# Patient Record
Sex: FEMALE | Race: WHITE | Employment: OTHER | URBAN - METROPOLITAN AREA
[De-identification: names, ages, dates, MRNs, and addresses within clinical notes are randomized per-mention and may not be internally consistent; named-entity substitution may affect disease eponyms.]

---

## 2017-07-09 ENCOUNTER — APPOINTMENT (OUTPATIENT)
Dept: GENERAL RADIOLOGY | Facility: HOSPITAL | Age: 52
End: 2017-07-09
Attending: EMERGENCY MEDICINE
Payer: COMMERCIAL

## 2017-07-09 PROCEDURE — 71101 X-RAY EXAM UNILAT RIBS/CHEST: CPT | Performed by: EMERGENCY MEDICINE

## 2017-07-09 NOTE — BH PROGRESS NOTE
Insurance pre-cert completed for inpatient transfer. Pt approved for 5 days, 7/9-7/13/17. Auth #[id number]. [facility] called and notified. Nurse to nurse to be done after shift change, 3:30 pm, at [phone].

## 2017-07-09 NOTE — ED PROVIDER NOTES
Patient Seen in: [facility] AND [facility] Emergency Department    History   Patient presents with:  Eval-P (psychiatric)    Stated Complaint: cp    HPI    The patient is a 78-year-old female with past history of fibromyalgia, depression, anxiety who presents no src: Oral  SpO2: 99 %  O2 Device: None (Room air)    Current:/75   Pulse 70   Temp (!) 97 °F (36.1 °C) (Oral)   Resp 14   Ht 157.5 cm (5' 2\")   Wt 59 kg   SpO2 97%   BMI 23.78 kg/m²         Physical Exam    Constitutional: Well-developed well-nouris PLATELET.   Procedure                               Abnormality         Status                     ---------                               -----------         ------                     CBC W/ DIFFERENTIAL[[id number]]          Abnormal            Final resul

## 2017-07-09 NOTE — ED NOTES
Pt safe to transfer to [facility] per MD. [facility]. Belongings given to [facility] by security.

## 2017-07-09 NOTE — BH PROGRESS NOTE
Level of Care Assessment Note     General Questions  Why are you here?: \"When I said I wanted to kill myself what I meant was I've been in pain a long, long time. I'm tired, I'm worn out and I want it to stop hurting. I don't want to die.   I just want i Ideation: Pt told RN and MD she wants to hurt herself, but denied during the assessment and pt stated she just wants to pain to stop. \"I would like to be dead but I don't want to miss out on anything ahead of me. \"  Current/Recent/Future Suicide Plan: No of Access: Household means  Access to [employer]: No  Discussion for Removal: N/A  Do you have a firearm owner ID card?: No  Access to Means Collateral Provided By[de-identified] Daughter  Describe Access to Means Collateral: Agrees with pt     Self Injury  History Julia (?)  Dates of Treatment:  For years  Date Last Seen: 6/17/17  Reason: Med management  Effectiveness : Pt does not think her meds are helping  Current/Previous MH/CD Treatment  Recovery Support Groups: Denies Past History  History of Seclusion/Restra Abuse: Denies  Verbal Abuse: Yes, past (Comment) (Ex-)  Sexual Abuse: Denies  Neglect: Denies  Does anyone say or do something to you that makes you feel unsafe?: No  Have You Ever Been Harmed by a Partner/Caregiver?: No  Health Concerns r/t Abuse: Bipolar sx, as well as a recent diagnosis of Sindy. Daughter said pt has extreme ups and downs and was unable to contract for safety.   Daughter said as recently as a few months ago family had to call 911 after pt threatened to kill herself in a hotel room

## 2017-07-09 NOTE — ED NOTES
Report given to [facility] TERRAZAS, RN at [facility].  Transport called for 17:30

## 2017-07-09 NOTE — ED NOTES
Pt states \"I might as well go home and commit suicide\". Reports depression and anxiety.  Hx suicide attempts in the past.

## 2017-07-09 NOTE — ED INITIAL ASSESSMENT (HPI)
Left side chest pain \"for months\". Pt states \"I don't know if it's my breast or my rib\". Denies sob. Had xrays done in April, workup was negative. S/S worsening per pt.

## 2017-07-09 NOTE — ED NOTES
Pt able to eat lunch independently after set up. RR even and nonlabored, tearful but redirectable. Security remains at bedside.

## 2018-06-24 ENCOUNTER — IMAGING SERVICES (OUTPATIENT)
Dept: OTHER | Age: 53
End: 2018-06-24

## 2018-06-24 ENCOUNTER — CHARTING TRANS (OUTPATIENT)
Dept: OTHER | Age: 53
End: 2018-06-24

## 2018-06-24 ENCOUNTER — DIAGNOSTIC TRANS (OUTPATIENT)
Dept: OTHER | Age: 53
End: 2018-06-24

## 2018-06-24 ENCOUNTER — HOSPITAL (OUTPATIENT)
Dept: OTHER | Age: 53
End: 2018-06-24
Attending: HOSPITALIST

## 2018-06-24 LAB
AMORPH SED URNS QL MICRO: ABNORMAL
ANALYZER ANC (IANC): ABNORMAL
ANION GAP SERPL CALC-SCNC: 12 MMOL/L (ref 10–20)
APPEARANCE UR: CLEAR
APTT PPP: 21 SECONDS (ref 22–30)
APTT PPP: ABNORMAL S
BACTERIA #/AREA URNS HPF: ABNORMAL /HPF
BASOPHILS # BLD: 0 THOUSAND/MCL (ref 0–0.3)
BASOPHILS NFR BLD: 0 %
BILIRUB UR QL: NEGATIVE
BUN SERPL-MCNC: 17 MG/DL (ref 6–20)
BUN/CREAT SERPL: 24 (ref 7–25)
CALCIUM SERPL-MCNC: 8.5 MG/DL (ref 8.4–10.2)
CAOX CRY URNS QL MICRO: ABNORMAL
CHLORIDE: 111 MMOL/L (ref 98–107)
CO2 SERPL-SCNC: 26 MMOL/L (ref 21–32)
COLOR UR: YELLOW
CREAT SERPL-MCNC: 0.7 MG/DL (ref 0.51–0.95)
DIFFERENTIAL METHOD BLD: ABNORMAL
EOSINOPHIL # BLD: 0 THOUSAND/MCL (ref 0.1–0.5)
EOSINOPHIL NFR BLD: 0 %
EPITH CASTS #/AREA URNS LPF: ABNORMAL /[LPF]
ERYTHROCYTE [DISTWIDTH] IN BLOOD: 15 % (ref 11–15)
FATTY CASTS #/AREA URNS LPF: ABNORMAL /[LPF]
GLUCOSE SERPL-MCNC: 107 MG/DL (ref 65–99)
GLUCOSE UR-MCNC: NEGATIVE MG/DL
GRAN CASTS #/AREA URNS LPF: ABNORMAL /[LPF]
HEMATOCRIT: 35.5 % (ref 36–46.5)
HGB BLD-MCNC: 11.9 GM/DL (ref 12–15.5)
HGB UR QL: NEGATIVE
HYALINE CASTS #/AREA URNS LPF: ABNORMAL /LPF (ref 0–5)
INR PPP: 1
KETONES UR-MCNC: 20 MG/DL
LEUKOCYTE ESTERASE UR QL STRIP: NEGATIVE
LYMPHOCYTES # BLD: 0.7 THOUSAND/MCL (ref 1–4)
LYMPHOCYTES NFR BLD: 7 %
MCH RBC QN AUTO: 30.9 PG (ref 26–34)
MCHC RBC AUTO-ENTMCNC: 33.5 GM/DL (ref 32–36.5)
MCV RBC AUTO: 92.2 FL (ref 78–100)
MIXED CELL CASTS #/AREA URNS LPF: ABNORMAL /[LPF]
MONOCYTES # BLD: 0.7 THOUSAND/MCL (ref 0.3–0.9)
MONOCYTES NFR BLD: 6 %
MUCOUS THREADS URNS QL MICRO: PRESENT
NEUTROPHILS # BLD: 9.3 THOUSAND/MCL (ref 1.8–7.7)
NEUTROPHILS NFR BLD: 87 %
NEUTS SEG NFR BLD: ABNORMAL %
NITRITE UR QL: NEGATIVE
NRBC (NRBCRE): ABNORMAL
PH UR: 7 UNIT (ref 5–7)
PLATELET # BLD: 278 THOUSAND/MCL (ref 140–450)
POTASSIUM SERPL-SCNC: 3.9 MMOL/L (ref 3.4–5.1)
PROT UR QL: NEGATIVE MG/DL
PROTHROMBIN TIME: 10 SECONDS (ref 9.7–11.8)
PROTHROMBIN TIME: NORMAL
RBC # BLD: 3.85 MILLION/MCL (ref 4–5.2)
RBC #/AREA URNS HPF: ABNORMAL /HPF (ref 0–3)
RBC CASTS #/AREA URNS LPF: ABNORMAL /[LPF]
RENAL EPI CELLS #/AREA URNS HPF: ABNORMAL /[HPF]
SODIUM SERPL-SCNC: 145 MMOL/L (ref 135–145)
SP GR UR: 1.02 (ref 1–1.03)
SPECIMEN SOURCE: ABNORMAL
SPERM URNS QL MICRO: ABNORMAL
SQUAMOUS #/AREA URNS HPF: ABNORMAL /HPF (ref 0–5)
T VAGINALIS URNS QL MICRO: ABNORMAL
TRI-PHOS CRY URNS QL MICRO: ABNORMAL
URATE CRY URNS QL MICRO: ABNORMAL
URINE REFLEX: ABNORMAL
URNS CMNT MICRO: ABNORMAL
UROBILINOGEN UR QL: 2 MG/DL (ref 0–1)
WAXY CASTS #/AREA URNS LPF: ABNORMAL /[LPF]
WBC # BLD: 10.8 THOUSAND/MCL (ref 4.2–11)
WBC #/AREA URNS HPF: ABNORMAL /HPF (ref 0–5)
WBC CASTS #/AREA URNS LPF: ABNORMAL /[LPF]
YEAST HYPHAE URNS QL MICRO: ABNORMAL
YEAST URNS QL MICRO: ABNORMAL

## 2018-06-25 LAB
25(OH)D3+25(OH)D2 SERPL-MCNC: 15.3 NG/ML (ref 30–100)
ANALYZER ANC (IANC): ABNORMAL
ANION GAP SERPL CALC-SCNC: 16 MMOL/L (ref 10–20)
BASOPHILS # BLD: 0 THOUSAND/MCL (ref 0–0.3)
BASOPHILS NFR BLD: 0 %
BUN SERPL-MCNC: 11 MG/DL (ref 6–20)
BUN/CREAT SERPL: 17 (ref 7–25)
CALCIUM SERPL-MCNC: 8.5 MG/DL (ref 8.4–10.2)
CHLORIDE: 106 MMOL/L (ref 98–107)
CO2 SERPL-SCNC: 24 MMOL/L (ref 21–32)
CREAT SERPL-MCNC: 0.65 MG/DL (ref 0.51–0.95)
DIFFERENTIAL METHOD BLD: ABNORMAL
EOSINOPHIL # BLD: 0 THOUSAND/MCL (ref 0.1–0.5)
EOSINOPHIL NFR BLD: 0 %
ERYTHROCYTE [DISTWIDTH] IN BLOOD: 15.3 % (ref 11–15)
GLUCOSE SERPL-MCNC: 134 MG/DL (ref 65–99)
HEMATOCRIT: 30.5 % (ref 36–46.5)
HGB BLD-MCNC: 10 GM/DL (ref 12–15.5)
LYMPHOCYTES # BLD: 0.5 THOUSAND/MCL (ref 1–4)
LYMPHOCYTES NFR BLD: 8 %
MCH RBC QN AUTO: 30.8 PG (ref 26–34)
MCHC RBC AUTO-ENTMCNC: 32.8 GM/DL (ref 32–36.5)
MCV RBC AUTO: 93.8 FL (ref 78–100)
MONOCYTES # BLD: 0.4 THOUSAND/MCL (ref 0.3–0.9)
MONOCYTES NFR BLD: 7 %
NEUTROPHILS # BLD: 5.2 THOUSAND/MCL (ref 1.8–7.7)
NEUTROPHILS NFR BLD: 85 %
NEUTS SEG NFR BLD: ABNORMAL %
NRBC (NRBCRE): ABNORMAL
PLATELET # BLD: 287 THOUSAND/MCL (ref 140–450)
POTASSIUM SERPL-SCNC: 3.8 MMOL/L (ref 3.4–5.1)
RBC # BLD: 3.25 MILLION/MCL (ref 4–5.2)
SODIUM SERPL-SCNC: 142 MMOL/L (ref 135–145)
WBC # BLD: 6.1 THOUSAND/MCL (ref 4.2–11)

## 2018-06-27 LAB
ANALYZER ANC (IANC): ABNORMAL
ANALYZER ANC (IANC): ABNORMAL
ANION GAP SERPL CALC-SCNC: 12 MMOL/L (ref 10–20)
APTT PPP: 25 SECONDS (ref 22–30)
APTT PPP: NORMAL S
BASOPHILS # BLD: 0 THOUSAND/MCL (ref 0–0.3)
BASOPHILS NFR BLD: 0 %
BUN SERPL-MCNC: 14 MG/DL (ref 6–20)
BUN/CREAT SERPL: 26 (ref 7–25)
CALCIUM SERPL-MCNC: 8.5 MG/DL (ref 8.4–10.2)
CHLORIDE: 104 MMOL/L (ref 98–107)
CO2 SERPL-SCNC: 28 MMOL/L (ref 21–32)
CREAT SERPL-MCNC: 0.53 MG/DL (ref 0.51–0.95)
DIFFERENTIAL METHOD BLD: ABNORMAL
EOSINOPHIL # BLD: 0 THOUSAND/MCL (ref 0.1–0.5)
EOSINOPHIL NFR BLD: 0 %
ERYTHROCYTE [DISTWIDTH] IN BLOOD: 15.2 % (ref 11–15)
ERYTHROCYTE [DISTWIDTH] IN BLOOD: 15.3 % (ref 11–15)
GLUCOSE SERPL-MCNC: 137 MG/DL (ref 65–99)
HEMATOCRIT: 28.3 % (ref 36–46.5)
HEMATOCRIT: 29.6 % (ref 36–46.5)
HGB BLD-MCNC: 9.4 GM/DL (ref 12–15.5)
HGB BLD-MCNC: 9.9 GM/DL (ref 12–15.5)
INR PPP: 0.9
LYMPHOCYTES # BLD: 1.5 THOUSAND/MCL (ref 1–4)
LYMPHOCYTES NFR BLD: 21 %
MCH RBC QN AUTO: 31 PG (ref 26–34)
MCH RBC QN AUTO: 31.3 PG (ref 26–34)
MCHC RBC AUTO-ENTMCNC: 33.2 GM/DL (ref 32–36.5)
MCHC RBC AUTO-ENTMCNC: 33.4 GM/DL (ref 32–36.5)
MCV RBC AUTO: 93.4 FL (ref 78–100)
MCV RBC AUTO: 93.7 FL (ref 78–100)
MONOCYTES # BLD: 0.2 THOUSAND/MCL (ref 0.3–0.9)
MONOCYTES NFR BLD: 3 %
NEUTROPHILS # BLD: 5.4 THOUSAND/MCL (ref 1.8–7.7)
NEUTROPHILS NFR BLD: 76 %
NEUTS SEG NFR BLD: ABNORMAL %
NRBC (NRBCRE): ABNORMAL
NRBC (NRBCRE): ABNORMAL
PLATELET # BLD: 288 THOUSAND/MCL (ref 140–450)
PLATELET # BLD: 290 THOUSAND/MCL (ref 140–450)
POTASSIUM SERPL-SCNC: 3.6 MMOL/L (ref 3.4–5.1)
PROTHROMBIN TIME: 9 SECONDS (ref 9.7–11.8)
PROTHROMBIN TIME: ABNORMAL
RBC # BLD: 3.03 MILLION/MCL (ref 4–5.2)
RBC # BLD: 3.16 MILLION/MCL (ref 4–5.2)
SODIUM SERPL-SCNC: 140 MMOL/L (ref 135–145)
WBC # BLD: 6.9 THOUSAND/MCL (ref 4.2–11)
WBC # BLD: 7.1 THOUSAND/MCL (ref 4.2–11)

## 2018-06-28 LAB
ANALYZER ANC (IANC): ABNORMAL
APTT PPP: 40 SECONDS (ref 22–30)
APTT PPP: 48 SECONDS (ref 22–30)
APTT PPP: 49 SECONDS (ref 22–30)
APTT PPP: 72 SECONDS (ref 22–30)
APTT PPP: ABNORMAL S
ERYTHROCYTE [DISTWIDTH] IN BLOOD: 15.2 % (ref 11–15)
HEMATOCRIT: 30.7 % (ref 36–46.5)
HGB BLD-MCNC: 10.2 GM/DL (ref 12–15.5)
MCH RBC QN AUTO: 30.9 PG (ref 26–34)
MCHC RBC AUTO-ENTMCNC: 33.2 GM/DL (ref 32–36.5)
MCV RBC AUTO: 93 FL (ref 78–100)
NRBC (NRBCRE): ABNORMAL
PLATELET # BLD: 311 THOUSAND/MCL (ref 140–450)
RBC # BLD: 3.3 MILLION/MCL (ref 4–5.2)
WBC # BLD: 7 THOUSAND/MCL (ref 4.2–11)

## 2018-06-29 LAB
ANALYZER ANC (IANC): ABNORMAL
APTT PPP: 54 SECONDS (ref 22–30)
APTT PPP: ABNORMAL S
ERYTHROCYTE [DISTWIDTH] IN BLOOD: 15.1 % (ref 11–15)
HEMATOCRIT: 28.7 % (ref 36–46.5)
HGB BLD-MCNC: 9.5 GM/DL (ref 12–15.5)
MCH RBC QN AUTO: 30.9 PG (ref 26–34)
MCHC RBC AUTO-ENTMCNC: 33.1 GM/DL (ref 32–36.5)
MCV RBC AUTO: 93.5 FL (ref 78–100)
NRBC (NRBCRE): ABNORMAL
PLATELET # BLD: 317 THOUSAND/MCL (ref 140–450)
RBC # BLD: 3.07 MILLION/MCL (ref 4–5.2)
WBC # BLD: 6.3 THOUSAND/MCL (ref 4.2–11)

## 2018-06-30 LAB
ANALYZER ANC (IANC): ABNORMAL
APTT PPP: 47 SECONDS (ref 22–30)
APTT PPP: ABNORMAL S
ERYTHROCYTE [DISTWIDTH] IN BLOOD: 14.9 % (ref 11–15)
HEMATOCRIT: 31 % (ref 36–46.5)
HGB BLD-MCNC: 10.3 GM/DL (ref 12–15.5)
MCH RBC QN AUTO: 30.7 PG (ref 26–34)
MCHC RBC AUTO-ENTMCNC: 33.2 GM/DL (ref 32–36.5)
MCV RBC AUTO: 92.5 FL (ref 78–100)
NRBC (NRBCRE): ABNORMAL
PLATELET # BLD: 374 THOUSAND/MCL (ref 140–450)
RBC # BLD: 3.35 MILLION/MCL (ref 4–5.2)
WBC # BLD: 6 THOUSAND/MCL (ref 4.2–11)

## 2018-07-01 LAB
APTT PPP: 42 SECONDS (ref 22–30)
APTT PPP: 51 SECONDS (ref 22–30)
APTT PPP: 54 SECONDS (ref 22–30)
APTT PPP: ABNORMAL S
INR PPP: 0.9
PROTHROMBIN TIME: 9.5 SECONDS (ref 9.7–11.8)
PROTHROMBIN TIME: ABNORMAL

## 2018-07-02 LAB
ANALYZER ANC (IANC): ABNORMAL
APTT PPP: 24 SECONDS (ref 22–30)
APTT PPP: NORMAL S
ERYTHROCYTE [DISTWIDTH] IN BLOOD: 15 % (ref 11–15)
HEMATOCRIT: 31.6 % (ref 36–46.5)
HGB BLD-MCNC: 10.4 GM/DL (ref 12–15.5)
MCH RBC QN AUTO: 30.1 PG (ref 26–34)
MCHC RBC AUTO-ENTMCNC: 32.9 GM/DL (ref 32–36.5)
MCV RBC AUTO: 91.6 FL (ref 78–100)
NRBC (NRBCRE): ABNORMAL
PLATELET # BLD: 472 THOUSAND/MCL (ref 140–450)
RBC # BLD: 3.45 MILLION/MCL (ref 4–5.2)
WBC # BLD: 5.1 THOUSAND/MCL (ref 4.2–11)

## 2018-07-03 ENCOUNTER — CHARTING TRANS (OUTPATIENT)
Dept: OTHER | Age: 53
End: 2018-07-03

## 2018-07-03 LAB
ANALYZER ANC (IANC): ABNORMAL
ANION GAP SERPL CALC-SCNC: 11 MMOL/L (ref 10–20)
APTT PPP: 25 SECONDS (ref 22–30)
APTT PPP: NORMAL S
BUN SERPL-MCNC: 10 MG/DL (ref 6–20)
BUN/CREAT SERPL: 17 (ref 7–25)
CALCIUM SERPL-MCNC: 8.7 MG/DL (ref 8.4–10.2)
CHLORIDE: 107 MMOL/L (ref 98–107)
CO2 SERPL-SCNC: 27 MMOL/L (ref 21–32)
CREAT SERPL-MCNC: 0.59 MG/DL (ref 0.51–0.95)
ERYTHROCYTE [DISTWIDTH] IN BLOOD: 15.3 % (ref 11–15)
GLUCOSE SERPL-MCNC: 107 MG/DL (ref 65–99)
HEMATOCRIT: 23.6 % (ref 36–46.5)
HGB BLD-MCNC: 7.8 GM/DL (ref 12–15.5)
MCH RBC QN AUTO: 30.6 PG (ref 26–34)
MCHC RBC AUTO-ENTMCNC: 33.1 GM/DL (ref 32–36.5)
MCV RBC AUTO: 92.5 FL (ref 78–100)
NRBC (NRBCRE): ABNORMAL
PLATELET # BLD: 413 THOUSAND/MCL (ref 140–450)
POTASSIUM SERPL-SCNC: 4.3 MMOL/L (ref 3.4–5.1)
RBC # BLD: 2.55 MILLION/MCL (ref 4–5.2)
SODIUM SERPL-SCNC: 141 MMOL/L (ref 135–145)
WBC # BLD: 6.5 THOUSAND/MCL (ref 4.2–11)

## 2018-07-04 LAB
ANALYZER ANC (IANC): ABNORMAL
ANION GAP SERPL CALC-SCNC: 12 MMOL/L (ref 10–20)
APTT PPP: 29 SECONDS (ref 22–30)
APTT PPP: NORMAL S
BUN SERPL-MCNC: 11 MG/DL (ref 6–20)
BUN/CREAT SERPL: 21 (ref 7–25)
CALCIUM SERPL-MCNC: 8.4 MG/DL (ref 8.4–10.2)
CHLORIDE: 106 MMOL/L (ref 98–107)
CO2 SERPL-SCNC: 26 MMOL/L (ref 21–32)
CREAT SERPL-MCNC: 0.53 MG/DL (ref 0.51–0.95)
ERYTHROCYTE [DISTWIDTH] IN BLOOD: 15.5 % (ref 11–15)
GLUCOSE SERPL-MCNC: 110 MG/DL (ref 65–99)
HEMATOCRIT: 22.1 % (ref 36–46.5)
HGB BLD-MCNC: 7.2 GM/DL (ref 12–15.5)
MCH RBC QN AUTO: 30.4 PG (ref 26–34)
MCHC RBC AUTO-ENTMCNC: 32.6 GM/DL (ref 32–36.5)
MCV RBC AUTO: 93.2 FL (ref 78–100)
NRBC (NRBCRE): ABNORMAL
PLATELET # BLD: 444 THOUSAND/MCL (ref 140–450)
POTASSIUM SERPL-SCNC: 3.5 MMOL/L (ref 3.4–5.1)
RBC # BLD: 2.37 MILLION/MCL (ref 4–5.2)
SODIUM SERPL-SCNC: 140 MMOL/L (ref 135–145)
WBC # BLD: 7.2 THOUSAND/MCL (ref 4.2–11)

## 2018-07-05 LAB
ANALYZER ANC (IANC): ABNORMAL
APTT PPP: 32 SECONDS (ref 22–30)
APTT PPP: ABNORMAL S
ERYTHROCYTE [DISTWIDTH] IN BLOOD: 15.7 % (ref 11–15)
HEMATOCRIT: 24.2 % (ref 36–46.5)
HGB BLD-MCNC: 7.8 GM/DL (ref 12–15.5)
MCH RBC QN AUTO: 30.4 PG (ref 26–34)
MCHC RBC AUTO-ENTMCNC: 32.2 GM/DL (ref 32–36.5)
MCV RBC AUTO: 94.2 FL (ref 78–100)
NRBC (NRBCRE): ABNORMAL
PLATELET # BLD: 512 THOUSAND/MCL (ref 140–450)
RBC # BLD: 2.57 MILLION/MCL (ref 4–5.2)
WBC # BLD: 6.5 THOUSAND/MCL (ref 4.2–11)

## 2018-07-06 LAB
APTT PPP: 27 SECONDS (ref 22–30)
APTT PPP: NORMAL S
HEMATOCRIT: 22.2 % (ref 36–46.5)
HEMATOCRIT: 29.9 % (ref 36–46.5)
HGB BLD-MCNC: 7 GM/DL (ref 12–15.5)
HGB BLD-MCNC: 9.7 GM/DL (ref 12–15.5)

## 2018-08-15 ENCOUNTER — IMAGING SERVICES (OUTPATIENT)
Dept: OTHER | Age: 53
End: 2018-08-15

## 2018-08-15 LAB
ANALYZER ANC (IANC): ABNORMAL
ANION GAP SERPL CALC-SCNC: 12 MMOL/L (ref 10–20)
BASOPHILS # BLD: 0 THOUSAND/MCL (ref 0–0.3)
BASOPHILS NFR BLD: 0 %
BUN SERPL-MCNC: 15 MG/DL (ref 6–20)
BUN/CREAT SERPL: 26 (ref 7–25)
CALCIUM SERPL-MCNC: 9.3 MG/DL (ref 8.4–10.2)
CHLORIDE: 106 MMOL/L (ref 98–107)
CO2 SERPL-SCNC: 27 MMOL/L (ref 21–32)
CREAT SERPL-MCNC: 0.58 MG/DL (ref 0.51–0.95)
CRP SERPL-MCNC: <0.3 MG/DL
DIFFERENTIAL METHOD BLD: ABNORMAL
EOSINOPHIL # BLD: 0 THOUSAND/MCL (ref 0.1–0.5)
EOSINOPHIL NFR BLD: 0 %
ERYTHROCYTE [DISTWIDTH] IN BLOOD: 14.1 % (ref 11–15)
ERYTHROCYTE [SEDIMENTATION RATE] IN BLOOD BY WESTERGREN METHOD: 16 MM/HR (ref 0–20)
GLUCOSE SERPL-MCNC: 97 MG/DL (ref 65–99)
HEMATOCRIT: 40.7 % (ref 36–46.5)
HGB BLD-MCNC: 13.4 GM/DL (ref 12–15.5)
LYMPHOCYTES # BLD: 1.7 THOUSAND/MCL (ref 1–4)
LYMPHOCYTES NFR BLD: 26 %
MCH RBC QN AUTO: 31.2 PG (ref 26–34)
MCHC RBC AUTO-ENTMCNC: 32.9 GM/DL (ref 32–36.5)
MCV RBC AUTO: 94.9 FL (ref 78–100)
MONOCYTES # BLD: 0.6 THOUSAND/MCL (ref 0.3–0.9)
MONOCYTES NFR BLD: 9 %
NEUTROPHILS # BLD: 4.1 THOUSAND/MCL (ref 1.8–7.7)
NEUTROPHILS NFR BLD: 65 %
NEUTS SEG NFR BLD: ABNORMAL %
NRBC (NRBCRE): ABNORMAL
PLATELET # BLD: 405 THOUSAND/MCL (ref 140–450)
POTASSIUM SERPL-SCNC: 4 MMOL/L (ref 3.4–5.1)
PROCALCITONIN SERPL IA-MCNC: <0.05 NG/ML
RBC # BLD: 4.29 MILLION/MCL (ref 4–5.2)
SODIUM SERPL-SCNC: 141 MMOL/L (ref 135–145)
WBC # BLD: 6.4 THOUSAND/MCL (ref 4.2–11)

## 2018-08-16 ENCOUNTER — CHARTING TRANS (OUTPATIENT)
Dept: OTHER | Age: 53
End: 2018-08-16

## 2018-08-16 ENCOUNTER — HOSPITAL (OUTPATIENT)
Dept: OTHER | Age: 53
End: 2018-08-16
Attending: INTERNAL MEDICINE

## 2018-08-16 LAB
ALBUMIN SERPL-MCNC: 2.7 GM/DL (ref 3.6–5.1)
ALBUMIN/GLOB SERPL: 1.1 {RATIO} (ref 1–2.4)
ALP SERPL-CCNC: 119 UNIT/L (ref 45–117)
ALT SERPL-CCNC: 18 UNIT/L
ANALYZER ANC (IANC): ABNORMAL
ANION GAP SERPL CALC-SCNC: 12 MMOL/L (ref 10–20)
AST SERPL-CCNC: 18 UNIT/L
BASOPHILS # BLD: 0 THOUSAND/MCL (ref 0–0.3)
BASOPHILS NFR BLD: 1 %
BILIRUB SERPL-MCNC: 0.4 MG/DL (ref 0.2–1)
BUN SERPL-MCNC: 8 MG/DL (ref 6–20)
BUN/CREAT SERPL: 15 (ref 7–25)
CALCIUM SERPL-MCNC: 8.3 MG/DL (ref 8.4–10.2)
CHLORIDE: 110 MMOL/L (ref 98–107)
CO2 SERPL-SCNC: 23 MMOL/L (ref 21–32)
CREAT SERPL-MCNC: 0.54 MG/DL (ref 0.51–0.95)
DIFFERENTIAL METHOD BLD: ABNORMAL
EOSINOPHIL # BLD: 0.1 THOUSAND/MCL (ref 0.1–0.5)
EOSINOPHIL NFR BLD: 1 %
ERYTHROCYTE [DISTWIDTH] IN BLOOD: 14.4 % (ref 11–15)
GLOBULIN SER-MCNC: 2.4 GM/DL (ref 2–4)
GLUCOSE SERPL-MCNC: 87 MG/DL (ref 65–99)
HEMATOCRIT: 33.3 % (ref 36–46.5)
HGB BLD-MCNC: 10.8 GM/DL (ref 12–15.5)
LYMPHOCYTES # BLD: 1.9 THOUSAND/MCL (ref 1–4)
LYMPHOCYTES NFR BLD: 45 %
MAGNESIUM SERPL-MCNC: 1.9 MG/DL (ref 1.7–2.4)
MCH RBC QN AUTO: 31.5 PG (ref 26–34)
MCHC RBC AUTO-ENTMCNC: 32.4 GM/DL (ref 32–36.5)
MCV RBC AUTO: 97.1 FL (ref 78–100)
MONOCYTES # BLD: 0.3 THOUSAND/MCL (ref 0.3–0.9)
MONOCYTES NFR BLD: 7 %
NEUTROPHILS # BLD: 2 THOUSAND/MCL (ref 1.8–7.7)
NEUTROPHILS NFR BLD: 46 %
NEUTS SEG NFR BLD: ABNORMAL %
NRBC (NRBCRE): ABNORMAL
PLATELET # BLD: 295 THOUSAND/MCL (ref 140–450)
POTASSIUM SERPL-SCNC: 4.3 MMOL/L (ref 3.4–5.1)
PROT SERPL-MCNC: 5.1 GM/DL (ref 6.4–8.2)
RBC # BLD: 3.43 MILLION/MCL (ref 4–5.2)
SODIUM SERPL-SCNC: 141 MMOL/L (ref 135–145)
WBC # BLD: 4.3 THOUSAND/MCL (ref 4.2–11)

## 2018-08-17 LAB — VANCOMYCIN TROUGH SERPL-MCNC: 7.5 MCG/ML (ref 10–20)

## 2018-08-18 ENCOUNTER — DIAGNOSTIC TRANS (OUTPATIENT)
Dept: OTHER | Age: 53
End: 2018-08-18

## 2018-08-18 LAB — VANCOMYCIN TROUGH SERPL-MCNC: 13.8 MCG/ML (ref 10–20)

## 2018-08-21 LAB
BUN SERPL-MCNC: 9 MG/DL (ref 6–20)
BUN/CREAT SERPL: 15 (ref 7–25)
CREAT SERPL-MCNC: 0.61 MG/DL (ref 0.51–0.95)

## 2018-09-05 ENCOUNTER — LAB REQUISITION (OUTPATIENT)
Dept: LAB | Facility: HOSPITAL | Age: 53
End: 2018-09-05
Payer: COMMERCIAL

## 2018-09-05 DIAGNOSIS — T14.8XXA OTHER INJURY OF UNSPECIFIED BODY REGION, INITIAL ENCOUNTER: ICD-10-CM

## 2018-09-05 LAB
ALBUMIN SERPL-MCNC: 3.3 G/DL (ref 3.5–4.8)
ALBUMIN/GLOB SERPL: 1.1 {RATIO} (ref 1–2)
ALP LIVER SERPL-CCNC: 180 U/L (ref 41–108)
ALT SERPL-CCNC: 44 U/L (ref 14–54)
ANION GAP SERPL CALC-SCNC: 9 MMOL/L (ref 0–18)
AST SERPL-CCNC: 41 U/L (ref 15–41)
BASOPHILS # BLD AUTO: 0.03 X10(3) UL (ref 0–0.1)
BASOPHILS NFR BLD AUTO: 1.1 %
BILIRUB SERPL-MCNC: 0.2 MG/DL (ref 0.1–2)
BUN BLD-MCNC: 12 MG/DL (ref 8–20)
BUN/CREAT SERPL: 16 (ref 10–20)
CALCIUM BLD-MCNC: 8.1 MG/DL (ref 8.3–10.3)
CHLORIDE SERPL-SCNC: 108 MMOL/L (ref 101–111)
CO2 SERPL-SCNC: 26 MMOL/L (ref 22–32)
CREAT BLD-MCNC: 0.75 MG/DL (ref 0.55–1.02)
CREAT BLD-MCNC: 0.75 MG/DL (ref 0.55–1.02)
CRP SERPL-MCNC: <0.29 MG/DL (ref ?–1)
EOSINOPHIL # BLD AUTO: 0.08 X10(3) UL (ref 0–0.3)
EOSINOPHIL NFR BLD AUTO: 2.8 %
ERYTHROCYTE [DISTWIDTH] IN BLOOD BY AUTOMATED COUNT: 12.9 % (ref 11.5–16)
GLOBULIN PLAS-MCNC: 3 G/DL (ref 2.5–4)
GLUCOSE BLD-MCNC: 109 MG/DL (ref 70–99)
HCT VFR BLD AUTO: 35 % (ref 34–50)
HGB BLD-MCNC: 11.1 G/DL (ref 12–16)
IMMATURE GRANULOCYTE COUNT: 0.01 X10(3) UL (ref 0–1)
IMMATURE GRANULOCYTE RATIO %: 0.4 %
LYMPHOCYTES # BLD AUTO: 1 X10(3) UL (ref 0.9–4)
LYMPHOCYTES NFR BLD AUTO: 35.2 %
M PROTEIN MFR SERPL ELPH: 6.3 G/DL (ref 6.1–8.3)
MCH RBC QN AUTO: 30.6 PG (ref 27–33.2)
MCHC RBC AUTO-ENTMCNC: 31.7 G/DL (ref 31–37)
MCV RBC AUTO: 96.4 FL (ref 81–100)
MONOCYTES # BLD AUTO: 0.46 X10(3) UL (ref 0.1–1)
MONOCYTES NFR BLD AUTO: 16.2 %
NEUTROPHIL ABS PRELIM: 1.26 X10 (3) UL (ref 1.3–6.7)
NEUTROPHILS # BLD AUTO: 1.26 X10(3) UL (ref 1.3–6.7)
NEUTROPHILS NFR BLD AUTO: 44.3 %
OSMOLALITY SERPL CALC.SUM OF ELEC: 296 MOSM/KG (ref 275–295)
PLATELET # BLD AUTO: 334 10(3)UL (ref 150–450)
POTASSIUM SERPL-SCNC: 4.1 MMOL/L (ref 3.6–5.1)
RBC # BLD AUTO: 3.63 X10(6)UL (ref 3.8–5.1)
RED CELL DISTRIBUTION WIDTH-SD: 46 FL (ref 35.1–46.3)
SED RATE-ML: 16 MM/HR (ref 0–25)
SODIUM SERPL-SCNC: 143 MMOL/L (ref 136–144)
WBC # BLD AUTO: 2.8 X10(3) UL (ref 4–13)

## 2018-09-05 PROCEDURE — 82565 ASSAY OF CREATININE: CPT | Performed by: INTERNAL MEDICINE

## 2018-09-05 PROCEDURE — 85025 COMPLETE CBC W/AUTO DIFF WBC: CPT | Performed by: INTERNAL MEDICINE

## 2018-09-05 PROCEDURE — 85652 RBC SED RATE AUTOMATED: CPT | Performed by: INTERNAL MEDICINE

## 2018-09-05 PROCEDURE — 80053 COMPREHEN METABOLIC PANEL: CPT | Performed by: INTERNAL MEDICINE

## 2018-09-05 PROCEDURE — 86140 C-REACTIVE PROTEIN: CPT | Performed by: INTERNAL MEDICINE

## 2020-01-20 ENCOUNTER — HOSPITAL (OUTPATIENT)
Dept: OTHER | Age: 55
End: 2020-01-20
Attending: ORTHOPAEDIC SURGERY

## 2020-01-22 ENCOUNTER — DIAGNOSTIC TRANS (OUTPATIENT)
Dept: OTHER | Age: 55
End: 2020-01-22

## 2020-01-27 LAB — PATHOLOGIST NAME: NORMAL
